# Patient Record
Sex: FEMALE | URBAN - METROPOLITAN AREA
[De-identification: names, ages, dates, MRNs, and addresses within clinical notes are randomized per-mention and may not be internally consistent; named-entity substitution may affect disease eponyms.]

---

## 2021-01-18 ENCOUNTER — NURSE TRIAGE (OUTPATIENT)
Dept: NURSING | Facility: CLINIC | Age: 21
End: 2021-01-18

## 2021-01-18 NOTE — TELEPHONE ENCOUNTER
Caller is complaining of pain with urination. Caller rates pain 7/10. Caller denies any fever, but states she is uncomfortable and has a sensation to urinate frequently. Caller denies any side or back pain. Triage guidelines recommend to see pcp within 24 hours. Caller verbalized and understands directives.  COVID 19 Nurse Triage Plan/Patient Instructions    Please be aware that novel coronavirus (COVID-19) may be circulating in the community. If you develop symptoms such as fever, cough, or SOB or if you have concerns about the presence of another infection including coronavirus (COVID-19), please contact your health care provider or visit [email or web address].     Disposition/Instructions    In-Person Visit with provider recommended. Reference Visit Selection Guide.    Thank you for taking steps to prevent the spread of this virus.  o Limit your contact with others.  o Wear a simple mask to cover your cough.  o Wash your hands well and often.    Resources    [facility]: About COVID-19: [email or web address]    CDC: What to Do If You're Sick: www.cdc.gov/coronavirus/2019-ncov/about/steps-when-sick.html    CDC: Ending Home Isolation: www.cdc.gov/coronavirus/2019-ncov/hcp/disposition-in-home-patients.html     CDC: Caring for Someone: www.cdc.gov/coronavirus/2019-ncov/if-you-are-sick/care-for-someone.html     [facility]: Interim Guidance for Hospital Discharge to Home: www.health.[facility].mn.us/diseases/coronavirus/hcp/hospdischarge.pdf    [facility] clinical trials (COVID-19 research studies): [email or web address]     Below are the COVID-19 hotlines at the [facility] ([facility]). Interpreters are available.   o For health questions: Call 787-431-3047 or 1-595.175.8933 (7 a.m. to 7 p.m.)  o For questions about schools and childcare: Call 987-478-8292 or 1-542.233.9412 (7 a.m. to 7 p.m.)                     Additional Information    Negative: Shock suspected (e.g.,  cold/pale/clammy skin, too weak to stand, low BP, rapid pulse)    Negative: Sounds like a life-threatening emergency to the triager    [1] Discomfort (pain, burning or stinging) when passing urine AND [2] female    Negative: Followed a genital area injury    Negative: Taking antibiotic for urinary tract infection (UTI)    Negative: Pregnant    Negative: Postpartum (from 0 to 6 weeks after delivery)    Negative: Shock suspected (e.g., cold/pale/clammy skin, too weak to stand, low BP, rapid pulse)    Negative: Sounds like a life-threatening emergency to the triager    Negative: [1] Unable to urinate (or only a few drops) > 4 hours AND [2] bladder feels very full (e.g., palpable bladder or strong urge to urinate)    Negative: Vomiting    Negative: Patient sounds very sick or weak to the triager    Negative: [1] SEVERE pain with urination  (e.g., excruciating) AND [2] not improved after 2 hours of pain medicine and Sitz bath    Negative: Fever > 100.5 F (38.1 C)    Negative: Side (flank) or lower back pain present    Negative: Diabetes mellitus or weak immune system (e.g., HIV positive, cancer chemo, splenectomy, organ transplant, chronic steroids)    Negative: Bedridden (e.g., nursing home patient, CVA, chronic illness, recovering from surgery)    Negative: Artificial heart valve or artificial joint    Negative: Unusual vaginal discharge (e.g., bad smelling, yellow, green, or foamy-white)    Negative: Patient is worried about sexually transmitted disease (STD)    Negative: Possibility of pregnancy    Negative: Blood in urine (red, pink, or tea-colored)    Negative: Age > 50 years    Negative: > 2 UTI's in last year    All other patients with painful urination(Exception: [1] EITHER frequency or urgency AND [2] has on-call doctor)    Protocols used: URINARY SYMPTOMS-A-AH, URINATION PAIN - FEMALE-A-AH